# Patient Record
Sex: FEMALE | Race: AMERICAN INDIAN OR ALASKA NATIVE | HISPANIC OR LATINO | Employment: OTHER | URBAN - METROPOLITAN AREA
[De-identification: names, ages, dates, MRNs, and addresses within clinical notes are randomized per-mention and may not be internally consistent; named-entity substitution may affect disease eponyms.]

---

## 2024-04-07 ENCOUNTER — HOSPITAL ENCOUNTER (OUTPATIENT)
Facility: HOSPITAL | Age: 89
Setting detail: OBSERVATION
Discharge: HOME | End: 2024-04-08
Attending: GENERAL PRACTICE | Admitting: INTERNAL MEDICINE
Payer: MEDICARE

## 2024-04-07 ENCOUNTER — APPOINTMENT (OUTPATIENT)
Dept: RADIOLOGY | Facility: HOSPITAL | Age: 89
End: 2024-04-07
Payer: MEDICARE

## 2024-04-07 ENCOUNTER — APPOINTMENT (OUTPATIENT)
Dept: CARDIOLOGY | Facility: HOSPITAL | Age: 89
End: 2024-04-07
Payer: MEDICARE

## 2024-04-07 DIAGNOSIS — I27.20 PULMONARY HTN (MULTI): ICD-10-CM

## 2024-04-07 DIAGNOSIS — R59.0 MEDIASTINAL LYMPHADENOPATHY: ICD-10-CM

## 2024-04-07 DIAGNOSIS — G45.8 OTHER TRANSIENT CEREBRAL ISCHEMIC ATTACKS AND RELATED SYNDROMES: ICD-10-CM

## 2024-04-07 DIAGNOSIS — G45.9 TIA (TRANSIENT ISCHEMIC ATTACK): Primary | ICD-10-CM

## 2024-04-07 PROBLEM — E73.9 LACTOSE INTOLERANCE: Status: ACTIVE | Noted: 2021-11-01

## 2024-04-07 PROBLEM — M80.00XA AGE-RELATED OSTEOPOROSIS WITH CURRENT PATHOLOGICAL FRACTURE: Status: RESOLVED | Noted: 2018-03-29 | Resolved: 2024-04-07

## 2024-04-07 PROBLEM — I10 ESSENTIAL HYPERTENSION: Chronic | Status: ACTIVE | Noted: 2018-03-29

## 2024-04-07 PROBLEM — I15.0 RENOVASCULAR HYPERTENSION: Status: ACTIVE | Noted: 2024-03-25

## 2024-04-07 PROBLEM — I70.1 RENAL ARTERY STENOSIS (CMS-HCC): Status: ACTIVE | Noted: 2024-03-20

## 2024-04-07 PROBLEM — E78.5 DYSLIPIDEMIA: Status: ACTIVE | Noted: 2018-03-29

## 2024-04-07 PROBLEM — I10 ESSENTIAL HYPERTENSION: Status: ACTIVE | Noted: 2018-03-29

## 2024-04-07 PROBLEM — E55.9 VITAMIN D DEFICIENCY: Status: ACTIVE | Noted: 2018-09-13

## 2024-04-07 PROBLEM — M80.00XA AGE-RELATED OSTEOPOROSIS WITH CURRENT PATHOLOGICAL FRACTURE: Status: ACTIVE | Noted: 2018-03-29

## 2024-04-07 PROBLEM — E78.5 DYSLIPIDEMIA: Chronic | Status: ACTIVE | Noted: 2018-03-29

## 2024-04-07 PROBLEM — F51.01 PRIMARY INSOMNIA: Status: ACTIVE | Noted: 2019-05-30

## 2024-04-07 LAB
ALBUMIN SERPL BCP-MCNC: 3.4 G/DL (ref 3.4–5)
ALP SERPL-CCNC: 55 U/L (ref 33–136)
ALT SERPL W P-5'-P-CCNC: 15 U/L (ref 7–45)
ANION GAP SERPL CALC-SCNC: 11 MMOL/L (ref 10–20)
AST SERPL W P-5'-P-CCNC: 22 U/L (ref 9–39)
BASOPHILS # BLD AUTO: 0.01 X10*3/UL (ref 0–0.1)
BASOPHILS NFR BLD AUTO: 0.2 %
BILIRUB SERPL-MCNC: 0.4 MG/DL (ref 0–1.2)
BUN SERPL-MCNC: 22 MG/DL (ref 6–23)
CALCIUM SERPL-MCNC: 8.5 MG/DL (ref 8.6–10.3)
CARDIAC TROPONIN I PNL SERPL HS: 8 NG/L (ref 0–13)
CHLORIDE SERPL-SCNC: 92 MMOL/L (ref 98–107)
CHOLEST SERPL-MCNC: 116 MG/DL (ref 0–199)
CHOLESTEROL/HDL RATIO: 2.2
CO2 SERPL-SCNC: 25 MMOL/L (ref 21–32)
CREAT SERPL-MCNC: 0.76 MG/DL (ref 0.5–1.05)
EGFRCR SERPLBLD CKD-EPI 2021: 76 ML/MIN/1.73M*2
EOSINOPHIL # BLD AUTO: 0.13 X10*3/UL (ref 0–0.4)
EOSINOPHIL NFR BLD AUTO: 2.1 %
ERYTHROCYTE [DISTWIDTH] IN BLOOD BY AUTOMATED COUNT: 13.6 % (ref 11.5–14.5)
GLUCOSE BLD MANUAL STRIP-MCNC: 129 MG/DL (ref 74–99)
GLUCOSE BLD MANUAL STRIP-MCNC: 86 MG/DL (ref 74–99)
GLUCOSE SERPL-MCNC: 113 MG/DL (ref 74–99)
HCT VFR BLD AUTO: 28 % (ref 36–46)
HDLC SERPL-MCNC: 51.7 MG/DL
HGB BLD-MCNC: 9.1 G/DL (ref 12–16)
IMM GRANULOCYTES # BLD AUTO: 0.03 X10*3/UL (ref 0–0.5)
IMM GRANULOCYTES NFR BLD AUTO: 0.5 % (ref 0–0.9)
INR PPP: 1 (ref 0.9–1.1)
LDLC SERPL CALC-MCNC: 54 MG/DL
LYMPHOCYTES # BLD AUTO: 1.05 X10*3/UL (ref 0.8–3)
LYMPHOCYTES NFR BLD AUTO: 17.3 %
MAGNESIUM SERPL-MCNC: 1.7 MG/DL (ref 1.6–2.4)
MCH RBC QN AUTO: 28 PG (ref 26–34)
MCHC RBC AUTO-ENTMCNC: 32.5 G/DL (ref 32–36)
MCV RBC AUTO: 86 FL (ref 80–100)
MONOCYTES # BLD AUTO: 0.81 X10*3/UL (ref 0.05–0.8)
MONOCYTES NFR BLD AUTO: 13.3 %
NEUTROPHILS # BLD AUTO: 4.05 X10*3/UL (ref 1.6–5.5)
NEUTROPHILS NFR BLD AUTO: 66.6 %
NON HDL CHOLESTEROL: 64 MG/DL (ref 0–149)
NRBC BLD-RTO: 0 /100 WBCS (ref 0–0)
PLATELET # BLD AUTO: 235 X10*3/UL (ref 150–450)
POTASSIUM SERPL-SCNC: 4.1 MMOL/L (ref 3.5–5.3)
PROT SERPL-MCNC: 6.6 G/DL (ref 6.4–8.2)
PROTHROMBIN TIME: 11.8 SECONDS (ref 9.8–12.8)
RBC # BLD AUTO: 3.25 X10*6/UL (ref 4–5.2)
SODIUM SERPL-SCNC: 124 MMOL/L (ref 136–145)
TRIGL SERPL-MCNC: 54 MG/DL (ref 0–149)
VLDL: 11 MG/DL (ref 0–40)
WBC # BLD AUTO: 6.1 X10*3/UL (ref 4.4–11.3)

## 2024-04-07 PROCEDURE — 84484 ASSAY OF TROPONIN QUANT: CPT | Performed by: GENERAL PRACTICE

## 2024-04-07 PROCEDURE — 80061 LIPID PANEL: CPT | Performed by: PHYSICIAN ASSISTANT

## 2024-04-07 PROCEDURE — 99222 1ST HOSP IP/OBS MODERATE 55: CPT | Performed by: PHYSICIAN ASSISTANT

## 2024-04-07 PROCEDURE — 2500000004 HC RX 250 GENERAL PHARMACY W/ HCPCS (ALT 636 FOR OP/ED): Performed by: PHYSICIAN ASSISTANT

## 2024-04-07 PROCEDURE — G0378 HOSPITAL OBSERVATION PER HR: HCPCS

## 2024-04-07 PROCEDURE — 2500000002 HC RX 250 W HCPCS SELF ADMINISTERED DRUGS (ALT 637 FOR MEDICARE OP, ALT 636 FOR OP/ED): Mod: MUE | Performed by: PHYSICIAN ASSISTANT

## 2024-04-07 PROCEDURE — 94640 AIRWAY INHALATION TREATMENT: CPT

## 2024-04-07 PROCEDURE — 83735 ASSAY OF MAGNESIUM: CPT | Performed by: GENERAL PRACTICE

## 2024-04-07 PROCEDURE — 99291 CRITICAL CARE FIRST HOUR: CPT | Performed by: GENERAL PRACTICE

## 2024-04-07 PROCEDURE — 70450 CT HEAD/BRAIN W/O DYE: CPT | Performed by: RADIOLOGY

## 2024-04-07 PROCEDURE — 70496 CT ANGIOGRAPHY HEAD: CPT | Performed by: RADIOLOGY

## 2024-04-07 PROCEDURE — 2500000002 HC RX 250 W HCPCS SELF ADMINISTERED DRUGS (ALT 637 FOR MEDICARE OP, ALT 636 FOR OP/ED): Performed by: GENERAL PRACTICE

## 2024-04-07 PROCEDURE — 85610 PROTHROMBIN TIME: CPT | Performed by: GENERAL PRACTICE

## 2024-04-07 PROCEDURE — 96372 THER/PROPH/DIAG INJ SC/IM: CPT | Mod: 59 | Performed by: PHYSICIAN ASSISTANT

## 2024-04-07 PROCEDURE — 82947 ASSAY GLUCOSE BLOOD QUANT: CPT | Mod: 59

## 2024-04-07 PROCEDURE — 2550000001 HC RX 255 CONTRASTS: Performed by: GENERAL PRACTICE

## 2024-04-07 PROCEDURE — 2500000004 HC RX 250 GENERAL PHARMACY W/ HCPCS (ALT 636 FOR OP/ED): Performed by: GENERAL PRACTICE

## 2024-04-07 PROCEDURE — 99285 EMERGENCY DEPT VISIT HI MDM: CPT | Mod: 25

## 2024-04-07 PROCEDURE — 96374 THER/PROPH/DIAG INJ IV PUSH: CPT

## 2024-04-07 PROCEDURE — 93005 ELECTROCARDIOGRAM TRACING: CPT

## 2024-04-07 PROCEDURE — 36415 COLL VENOUS BLD VENIPUNCTURE: CPT | Performed by: GENERAL PRACTICE

## 2024-04-07 PROCEDURE — 85025 COMPLETE CBC W/AUTO DIFF WBC: CPT | Performed by: GENERAL PRACTICE

## 2024-04-07 PROCEDURE — 70498 CT ANGIOGRAPHY NECK: CPT | Performed by: RADIOLOGY

## 2024-04-07 PROCEDURE — 70450 CT HEAD/BRAIN W/O DYE: CPT | Mod: 59

## 2024-04-07 PROCEDURE — 2500000001 HC RX 250 WO HCPCS SELF ADMINISTERED DRUGS (ALT 637 FOR MEDICARE OP): Performed by: GENERAL PRACTICE

## 2024-04-07 PROCEDURE — 83036 HEMOGLOBIN GLYCOSYLATED A1C: CPT | Mod: AHULAB | Performed by: PHYSICIAN ASSISTANT

## 2024-04-07 PROCEDURE — 96376 TX/PRO/DX INJ SAME DRUG ADON: CPT

## 2024-04-07 PROCEDURE — 80053 COMPREHEN METABOLIC PANEL: CPT | Performed by: GENERAL PRACTICE

## 2024-04-07 PROCEDURE — 96361 HYDRATE IV INFUSION ADD-ON: CPT

## 2024-04-07 PROCEDURE — 2500000001 HC RX 250 WO HCPCS SELF ADMINISTERED DRUGS (ALT 637 FOR MEDICARE OP): Performed by: PHYSICIAN ASSISTANT

## 2024-04-07 PROCEDURE — 70498 CT ANGIOGRAPHY NECK: CPT

## 2024-04-07 PROCEDURE — 82947 ASSAY GLUCOSE BLOOD QUANT: CPT | Mod: 91

## 2024-04-07 PROCEDURE — 70496 CT ANGIOGRAPHY HEAD: CPT

## 2024-04-07 RX ORDER — DEXTROSE 50 % IN WATER (D50W) INTRAVENOUS SYRINGE
12.5
Status: DISCONTINUED | OUTPATIENT
Start: 2024-04-07 | End: 2024-04-08 | Stop reason: HOSPADM

## 2024-04-07 RX ORDER — HYDRALAZINE HYDROCHLORIDE 100 MG/1
100 TABLET, FILM COATED ORAL 3 TIMES DAILY
COMMUNITY

## 2024-04-07 RX ORDER — LOSARTAN POTASSIUM AND HYDROCHLOROTHIAZIDE 25; 100 MG/1; MG/1
1 TABLET ORAL DAILY
COMMUNITY
Start: 2023-11-13 | End: 2024-05-11

## 2024-04-07 RX ORDER — TRAZODONE HYDROCHLORIDE 50 MG/1
1 TABLET ORAL NIGHTLY
COMMUNITY
Start: 2023-06-02

## 2024-04-07 RX ORDER — BUDESONIDE 0.5 MG/2ML
0.5 INHALANT ORAL
Status: DISCONTINUED | OUTPATIENT
Start: 2024-04-07 | End: 2024-04-08 | Stop reason: HOSPADM

## 2024-04-07 RX ORDER — ASPIRIN 81 MG/1
81 TABLET ORAL DAILY
Status: DISCONTINUED | OUTPATIENT
Start: 2024-04-07 | End: 2024-04-08 | Stop reason: HOSPADM

## 2024-04-07 RX ORDER — HYDRALAZINE HYDROCHLORIDE 20 MG/ML
10 INJECTION INTRAMUSCULAR; INTRAVENOUS ONCE
Status: COMPLETED | OUTPATIENT
Start: 2024-04-07 | End: 2024-04-07

## 2024-04-07 RX ORDER — ENOXAPARIN SODIUM 100 MG/ML
40 INJECTION SUBCUTANEOUS EVERY 24 HOURS
Status: DISCONTINUED | OUTPATIENT
Start: 2024-04-07 | End: 2024-04-08 | Stop reason: HOSPADM

## 2024-04-07 RX ORDER — ATORVASTATIN CALCIUM 10 MG/1
10 TABLET, FILM COATED ORAL NIGHTLY
Status: ON HOLD | COMMUNITY
Start: 2023-12-20 | End: 2024-04-08 | Stop reason: SDUPTHER

## 2024-04-07 RX ORDER — BUDESONIDE AND FORMOTEROL FUMARATE DIHYDRATE 160; 4.5 UG/1; UG/1
2 AEROSOL RESPIRATORY (INHALATION) 2 TIMES DAILY
COMMUNITY
Start: 2024-03-20

## 2024-04-07 RX ORDER — TRAZODONE HYDROCHLORIDE 50 MG/1
100 TABLET ORAL NIGHTLY
Status: DISCONTINUED | OUTPATIENT
Start: 2024-04-07 | End: 2024-04-08 | Stop reason: HOSPADM

## 2024-04-07 RX ORDER — FORMOTEROL FUMARATE DIHYDRATE 20 UG/2ML
20 SOLUTION RESPIRATORY (INHALATION)
Status: DISCONTINUED | OUTPATIENT
Start: 2024-04-07 | End: 2024-04-08 | Stop reason: HOSPADM

## 2024-04-07 RX ORDER — ALENDRONATE SODIUM 70 MG/1
70 TABLET ORAL WEEKLY
COMMUNITY
Start: 2023-12-20 | End: 2024-12-19

## 2024-04-07 RX ORDER — CALCIUM CARBONATE/VITAMIN D3 600 MG-20
1 TABLET,CHEWABLE ORAL DAILY
COMMUNITY
Start: 2021-11-01

## 2024-04-07 RX ORDER — NAPROXEN SODIUM 220 MG/1
324 TABLET, FILM COATED ORAL ONCE
Status: COMPLETED | OUTPATIENT
Start: 2024-04-07 | End: 2024-04-07

## 2024-04-07 RX ORDER — FLUTICASONE FUROATE AND VILANTEROL 200; 25 UG/1; UG/1
1 POWDER RESPIRATORY (INHALATION)
Status: DISCONTINUED | OUTPATIENT
Start: 2024-04-08 | End: 2024-04-07

## 2024-04-07 RX ORDER — INSULIN LISPRO 100 [IU]/ML
0-5 INJECTION, SOLUTION INTRAVENOUS; SUBCUTANEOUS
Status: DISCONTINUED | OUTPATIENT
Start: 2024-04-07 | End: 2024-04-08 | Stop reason: HOSPADM

## 2024-04-07 RX ORDER — MELOXICAM 15 MG/1
15 TABLET ORAL DAILY
Status: ON HOLD | COMMUNITY
Start: 2023-08-07 | End: 2024-04-07 | Stop reason: ALTCHOICE

## 2024-04-07 RX ORDER — METOPROLOL SUCCINATE 25 MG/1
25 TABLET, EXTENDED RELEASE ORAL DAILY
COMMUNITY
Start: 2023-12-20

## 2024-04-07 RX ORDER — ALBUTEROL SULFATE 90 UG/1
1-2 AEROSOL, METERED RESPIRATORY (INHALATION) EVERY 6 HOURS PRN
COMMUNITY
Start: 2023-12-20

## 2024-04-07 RX ORDER — IPRATROPIUM BROMIDE AND ALBUTEROL SULFATE 2.5; .5 MG/3ML; MG/3ML
3 SOLUTION RESPIRATORY (INHALATION) ONCE
Status: COMPLETED | OUTPATIENT
Start: 2024-04-07 | End: 2024-04-07

## 2024-04-07 RX ORDER — ATORVASTATIN CALCIUM 40 MG/1
40 TABLET, FILM COATED ORAL NIGHTLY
Status: DISCONTINUED | OUTPATIENT
Start: 2024-04-07 | End: 2024-04-08 | Stop reason: HOSPADM

## 2024-04-07 RX ORDER — DEXTROSE 50 % IN WATER (D50W) INTRAVENOUS SYRINGE
25
Status: DISCONTINUED | OUTPATIENT
Start: 2024-04-07 | End: 2024-04-08 | Stop reason: HOSPADM

## 2024-04-07 RX ADMIN — HYDRALAZINE HYDROCHLORIDE 10 MG: 20 INJECTION INTRAMUSCULAR; INTRAVENOUS at 19:04

## 2024-04-07 RX ADMIN — SODIUM CHLORIDE 1000 ML: 9 INJECTION, SOLUTION INTRAVENOUS at 19:03

## 2024-04-07 RX ADMIN — IOHEXOL 75 ML: 350 INJECTION, SOLUTION INTRAVENOUS at 16:02

## 2024-04-07 RX ADMIN — HYDRALAZINE HYDROCHLORIDE 10 MG: 20 INJECTION INTRAMUSCULAR; INTRAVENOUS at 16:53

## 2024-04-07 RX ADMIN — TRAZODONE HYDROCHLORIDE 100 MG: 50 TABLET ORAL at 22:00

## 2024-04-07 RX ADMIN — ATORVASTATIN CALCIUM 40 MG: 40 TABLET, FILM COATED ORAL at 22:00

## 2024-04-07 RX ADMIN — ENOXAPARIN SODIUM 40 MG: 40 INJECTION SUBCUTANEOUS at 21:59

## 2024-04-07 RX ADMIN — FORMOTEROL FUMARATE DIHYDRATE 20 MCG: 20 SOLUTION RESPIRATORY (INHALATION) at 21:38

## 2024-04-07 RX ADMIN — IPRATROPIUM BROMIDE AND ALBUTEROL SULFATE 3 ML: 2.5; .5 SOLUTION RESPIRATORY (INHALATION) at 17:45

## 2024-04-07 RX ADMIN — BUDESONIDE INHALATION 0.5 MG: 0.5 SUSPENSION RESPIRATORY (INHALATION) at 21:38

## 2024-04-07 RX ADMIN — ASPIRIN 81 MG 324 MG: 81 TABLET ORAL at 16:25

## 2024-04-07 SDOH — SOCIAL STABILITY: SOCIAL INSECURITY: ABUSE: ADULT

## 2024-04-07 SDOH — SOCIAL STABILITY: SOCIAL INSECURITY: DO YOU FEEL UNSAFE GOING BACK TO THE PLACE WHERE YOU ARE LIVING?: NO

## 2024-04-07 SDOH — SOCIAL STABILITY: SOCIAL INSECURITY: DO YOU FEEL ANYONE HAS EXPLOITED OR TAKEN ADVANTAGE OF YOU FINANCIALLY OR OF YOUR PERSONAL PROPERTY?: NO

## 2024-04-07 SDOH — SOCIAL STABILITY: SOCIAL INSECURITY: DOES ANYONE TRY TO KEEP YOU FROM HAVING/CONTACTING OTHER FRIENDS OR DOING THINGS OUTSIDE YOUR HOME?: NO

## 2024-04-07 SDOH — SOCIAL STABILITY: SOCIAL INSECURITY: ARE THERE ANY APPARENT SIGNS OF INJURIES/BEHAVIORS THAT COULD BE RELATED TO ABUSE/NEGLECT?: NO

## 2024-04-07 SDOH — SOCIAL STABILITY: SOCIAL INSECURITY: HAVE YOU HAD THOUGHTS OF HARMING ANYONE ELSE?: NO

## 2024-04-07 SDOH — SOCIAL STABILITY: SOCIAL INSECURITY: ARE YOU OR HAVE YOU BEEN THREATENED OR ABUSED PHYSICALLY, EMOTIONALLY, OR SEXUALLY BY ANYONE?: NO

## 2024-04-07 SDOH — SOCIAL STABILITY: SOCIAL INSECURITY: WERE YOU ABLE TO COMPLETE ALL THE BEHAVIORAL HEALTH SCREENINGS?: YES

## 2024-04-07 SDOH — SOCIAL STABILITY: SOCIAL INSECURITY: HAS ANYONE EVER THREATENED TO HURT YOUR FAMILY OR YOUR PETS?: NO

## 2024-04-07 ASSESSMENT — LIFESTYLE VARIABLES
HOW MANY STANDARD DRINKS CONTAINING ALCOHOL DO YOU HAVE ON A TYPICAL DAY: PATIENT DOES NOT DRINK
AUDIT-C TOTAL SCORE: 0
SKIP TO QUESTIONS 9-10: 1
AUDIT-C TOTAL SCORE: 0
HOW OFTEN DO YOU HAVE 6 OR MORE DRINKS ON ONE OCCASION: NEVER
HOW OFTEN DO YOU HAVE A DRINK CONTAINING ALCOHOL: NEVER

## 2024-04-07 ASSESSMENT — ACTIVITIES OF DAILY LIVING (ADL)
FEEDING YOURSELF: INDEPENDENT
DRESSING YOURSELF: INDEPENDENT
BATHING: INDEPENDENT
GROOMING: INDEPENDENT
WALKS IN HOME: INDEPENDENT
TOILETING: INDEPENDENT
HEARING - LEFT EAR: FUNCTIONAL
ASSISTIVE_DEVICE: DENTURES UPPER;DENTURES LOWER
HEARING - RIGHT EAR: FUNCTIONAL
PATIENT'S MEMORY ADEQUATE TO SAFELY COMPLETE DAILY ACTIVITIES?: YES
LACK_OF_TRANSPORTATION: NO
ADEQUATE_TO_COMPLETE_ADL: YES
JUDGMENT_ADEQUATE_SAFELY_COMPLETE_DAILY_ACTIVITIES: YES

## 2024-04-07 ASSESSMENT — PAIN SCALES - GENERAL
PAINLEVEL_OUTOF10: 0 - NO PAIN
PAINLEVEL_OUTOF10: 0 - NO PAIN

## 2024-04-07 ASSESSMENT — COGNITIVE AND FUNCTIONAL STATUS - GENERAL
PATIENT BASELINE BEDBOUND: NO
STANDING UP FROM CHAIR USING ARMS: A LITTLE
CLIMB 3 TO 5 STEPS WITH RAILING: A LITTLE
WALKING IN HOSPITAL ROOM: A LITTLE
DAILY ACTIVITIY SCORE: 23
DRESSING REGULAR LOWER BODY CLOTHING: A LITTLE
MOBILITY SCORE: 21

## 2024-04-07 ASSESSMENT — PAIN - FUNCTIONAL ASSESSMENT
PAIN_FUNCTIONAL_ASSESSMENT: 0-10
PAIN_FUNCTIONAL_ASSESSMENT: 0-10

## 2024-04-07 ASSESSMENT — PATIENT HEALTH QUESTIONNAIRE - PHQ9
1. LITTLE INTEREST OR PLEASURE IN DOING THINGS: NOT AT ALL
2. FEELING DOWN, DEPRESSED OR HOPELESS: NOT AT ALL
SUM OF ALL RESPONSES TO PHQ9 QUESTIONS 1 & 2: 0

## 2024-04-07 ASSESSMENT — COLUMBIA-SUICIDE SEVERITY RATING SCALE - C-SSRS
6. HAVE YOU EVER DONE ANYTHING, STARTED TO DO ANYTHING, OR PREPARED TO DO ANYTHING TO END YOUR LIFE?: NO
2. HAVE YOU ACTUALLY HAD ANY THOUGHTS OF KILLING YOURSELF?: NO
1. IN THE PAST MONTH, HAVE YOU WISHED YOU WERE DEAD OR WISHED YOU COULD GO TO SLEEP AND NOT WAKE UP?: NO

## 2024-04-07 NOTE — ED PROVIDER NOTES
HPI   No chief complaint on file.      HPI: 88-year-old female with a history of hypertension presents for dysarthria and left-sided weakness.  At approximately noon the patient told her family that she felt tired and was going to lie down.  At approximately 12:50 PM family checked on her and noted that she had a facial droop and left-sided weakness and dysarthria.  They did not call EMS until 3 PM.  On presentation to the ED her left-sided weakness has resolved but she is left with dysarthria/aphasia.  She is denying chest pain, headache, change in vision.      Limitations to history: None  Independent Historians: Patient's family, EMS  External Records Reviewed: HIE, outpatient notes, inpatient notes  ------------------------------------------------------------------------------------------------------------------------------------------  ROS: a ten point review of systems was performed and was negative except as per HPI.  ------------------------------------------------------------------------------------------------------------------------------------------  PMH / PSH: as per HPI, otherwise reviewed in EMR  MEDS: as per HPI, otherwise reviewed in EMR  ALLERGIES: as per HPI, otherwise reviewed in EMR  SocH:  as per HPI, otherwise reviewed in EMR  FH:  as per HPI, otherwise reviewed in EMR  ------------------------------------------------------------------------------------------------------------------------------------------  Physical Exam:  VS: As documented in the triage note and EMR flowsheet from this visit was reviewed  General: Well appearing. No acute distress.   Eyes:  Extraocular movements grossly intact. No scleral icterus. No discharge  HEENT:  Normocephalic.  Atraumatic  Neck: Moves neck freely. No gross masses  CV: Regular rhythm. No murmurs, rubs or gallops   Resp: Clear to auscultation bilaterally. No respiratory distress.    GI: Soft, no masses, nontender. No rebound tenderness or guarding  MSK:  Symmetric muscle bulk. No deformities. No lower extremity edema.    Skin: Warm, dry, intact.   Neuro: Severe dysarthria.  No drift in the upper or lower extremities.  No ataxia.  NIH stroke scale of 5  Psych: Appropriate for situation  ------------------------------------------------------------------------------------------------------------------------------------------  Hospital Course / Medical Decision Making:  Independent Interpretations: CT head, CTA head and neck  EKG as interpreted by me: Normal sinus rhythm at 62 bpm with a first-degree AV block with a SD interval of 226 ms with a normal axis, no bundle branch block no signs of acute ischemia    MDM: This is an 88-year-old female with a history of hypertension presenting for dysarthria and left-sided weakness.  On presentation the patient's left-sided weakness has resolved but she is left with dysarthria/aphasia.  She is van positive.  A brain attack was called and the patient was brought to CT.  No major vessel cutoff for acute intracranial process noted.  Initially the patient was thought to be a TNK candidate but when the patient returned from CT her dysarthria/aphasia seems to have resolved.  For this reason she was not given TNK.  Her son is present at bedside and states that she is speaking normally for her.  She was given aspirin and was admitted to the medicine service for further evaluation.    Discussion of Management with Other Providers:   I discussed the patient/results with: Emergency medicine team    Final diagnosis and disposition as below.    Results for orders placed or performed during the hospital encounter of 04/07/24  -CBC and Auto Differential:        Result                      Value             Ref Range           WBC                         6.1               4.4 - 11.3 x*       nRBC                        0.0               0.0 - 0.0 /1*       RBC                         3.25 (L)          4.00 - 5.20 *       Hemoglobin                   9.1 (L)           12.0 - 16.0 *       Hematocrit                  28.0 (L)          36.0 - 46.0 %       MCV                         86                80 - 100 fL         MCH                         28.0              26.0 - 34.0 *       MCHC                        32.5              32.0 - 36.0 *       RDW                         13.6              11.5 - 14.5 %       Platelets                   235               150 - 450 x1*       Neutrophils %               66.6              40.0 - 80.0 %       Immature Granulocytes *     0.5               0.0 - 0.9 %         Lymphocytes %               17.3              13.0 - 44.0 %       Monocytes %                 13.3              2.0 - 10.0 %        Eosinophils %               2.1               0.0 - 6.0 %         Basophils %                 0.2               0.0 - 2.0 %         Neutrophils Absolute        4.05              1.60 - 5.50 *       Immature Granulocytes *     0.03              0.00 - 0.50 *       Lymphocytes Absolute        1.05              0.80 - 3.00 *       Monocytes Absolute          0.81 (H)          0.05 - 0.80 *       Eosinophils Absolute        0.13              0.00 - 0.40 *       Basophils Absolute          0.01              0.00 - 0.10 *  -Magnesium:        Result                      Value             Ref Range           Magnesium                   1.70              1.60 - 2.40 *  -Comprehensive metabolic panel:        Result                      Value             Ref Range           Glucose                     113 (H)           74 - 99 mg/dL       Sodium                      124 (L)           136 - 145 mm*       Potassium                   4.1               3.5 - 5.3 mm*       Chloride                    92 (L)            98 - 107 mmo*       Bicarbonate                 25                21 - 32 mmol*       Anion Gap                   11                10 - 20 mmol*       Urea Nitrogen               22                6 - 23 mg/dL        Creatinine                   0.76              0.50 - 1.05 *       eGFR                        76                >60 mL/min/1*       Calcium                     8.5 (L)           8.6 - 10.3 m*       Albumin                     3.4               3.4 - 5.0 g/*       Alkaline Phosphatase        55                33 - 136 U/L        Total Protein               6.6               6.4 - 8.2 g/*       AST                         22                9 - 39 U/L          Bilirubin, Total            0.4               0.0 - 1.2 mg*       ALT                         15                7 - 45 U/L     -Troponin I, High Sensitivity:        Result                      Value             Ref Range           Troponin I, High Sensi*     8                 0 - 13 ng/L    -Protime-INR:        Result                      Value             Ref Range           Protime                     11.8              9.8 - 12.8 s*       INR                         1.0               0.9 - 1.1      -POCT GLUCOSE:        Result                      Value             Ref Range           POCT Glucose                129 (H)           74 - 99 mg/dL  CT angio brain attack neck w IV contrast and post procedure   Final Result    * A proximally 50% luminal narrowing in the 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          MACRO:    none          Signed by: Hector Sweet 4/7/2024 4:18 PM    Dictation workstation:   [id number]     CT brain attack head wo IV contrast   Final Result    No evidence of acute cortical infarct or intracranial hemorrhage. If    there is persistent clinical concern for acute cortical infarction,    MRI with diffusion-weighted images is a better means for further    evaluation as clinically warranted.          SUPPLEMENTAL INFORMATION:    Notifi message was left for BRITTNEE AMAYA regarding this exam by Dr. Steel on 4/7/2024 at approximately 15:44 hours.                Signed by: Nola Steel 4/7/2024 3:45 PM    Dictation workstation:   [id number]                               [place] Coma Scale Score: 15         NIH Stroke Scale: 1             Patient History   No past medical history on file.  No past surgical history on file.  No family history on file.  Social History     Tobacco Use    Smoking status: Not on file    Smokeless tobacco: Not on file   Substance Use Topics    Alcohol use: Not on file    Drug use: Not on file       Physical Exam   ED Triage Vitals [04/07/24 1625]   Temperature Heart Rate Respirations BP   36.9 °C (98.4 °F) 64 (!) 25 (!) 208/53      Pulse Ox Temp src Heart Rate Source Patient Position   (!) 93 % -- -- --      BP Location FiO2 (%)     -- --       Physical Exam    ED Course & MDM   Diagnoses as of 04/15/24 1928   TIA (transient ischemic attack)       Medical Decision Making      Procedure  Critical Care    Performed by: Brittnee Amaya DO  Authorized by: Brittnee Amaya DO    Critical care provider statement:     Critical care time (minutes):  30    Critical care time was exclusive of:  Separately billable procedures and treating other patients    Critical care was necessary to treat or prevent imminent or life-threatening deterioration of the  following conditions:  CNS failure or compromise    Critical care was time spent personally by me on the following activities:  Development of treatment plan with patient or surrogate, examination of patient, ordering and review of radiographic studies, ordering and review of laboratory studies and re-evaluation of patient's condition    Care discussed with: admitting provider         Subhash Amaya DO  04/15/24 8289

## 2024-04-07 NOTE — H&P
History Of Present Illness  Rosalio Brewster is a 88 y.o. female with PMH significant for renovascular hypertension, R renal artery stenosis, HTN, and who presented to the ER with facial droop, left sided weakness, and dysarthria.   LKW was around 12pm.  Patient was feeling tired so she went to lie down.  Family checked on her around 1250pm and noted patient had facial droop, left sided weakness and dysarthria.  EMS was called at 3pm, and patient arrived in the ER around 330pm and brain attack was called.  Per report on arrival to the ER left sided weakness had resolved, and only had dysarthria.  CTH negative.  CTA without acute cutoffs.  By the time she returned from CT, she was back to baseline neuro status.  She was given full dose aspirin and admitted to the observation unit for TIA/CVA work-up. Her BP was elevated in the ER, and she received IV hydralazine.  Patient has known renal artery stenosis, with 90% stenosis of R renal artery, and is scheduled to have stent placed on 4/9/24 at [facility].    Per son patient also had negative stress test recently at [facility].      She can read/speak/understand English, but English is 2nd language.  Son does act as  for some questions and to assist in her understanding the commands asked etc.  She lives at home with family.  She is ambulatory baseline, eats healthy, and stays active.    Past Medical History  Past Medical History:   Diagnosis Date    Age-related osteoporosis with current pathological fracture 03/29/2018    Dyslipidemia 03/29/2018    Essential hypertension 03/29/2018    Mild persistent asthma without complication 09/20/2016    Primary insomnia 05/30/2019    Renal artery stenosis (CMS/HCC) 03/20/2024    Renovascular hypertension 03/25/2024    Vitamin D deficiency 09/13/2018       Surgical History  No past surgical history on file.    Social History  Nicotine: Never  ETOH: Denies  Illicit substances: Denies     Family History  Reviewed.   Negative/non-contributory      Allergies  Allergies as of 04/07/2024 - Reviewed 04/07/2024   Allergen Reaction Noted    Latex Unknown 11/13/2023    Penicillins Rash 07/29/2002        Review of Systems  Review of Systems   Constitutional:  Negative for chills and fever.   HENT: Negative.     Eyes: Negative.    Respiratory: Negative.  Negative for shortness of breath.    Cardiovascular: Negative.  Negative for chest pain.   Gastrointestinal: Negative.  Negative for abdominal pain, nausea and vomiting.   Endocrine: Negative.    Genitourinary: Negative.    Musculoskeletal: Negative.    Skin: Negative.    Allergic/Immunologic: Negative.    Neurological:  Positive for facial asymmetry and speech difficulty.        SEE HPI     Hematological: Negative.    Psychiatric/Behavioral: Negative.     All other systems reviewed and are negative.      Relevant results reviewed   PROVIDER notes  Nursing notes  MEDS:  Current Facility-Administered Medications   Medication Dose Route Frequency Provider Last Rate Last Admin    aspirin EC tablet 81 mg  81 mg oral Daily Belinda Vásquez PA-C        atorvastatin (Lipitor) tablet 40 mg  40 mg oral Nightly Belinda Vásquez PA-C   40 mg at 04/07/24 2200    budesonide (Pulmicort) 0.5 mg/2 mL nebulizer solution 0.5 mg  0.5 mg nebulization BID GARETH Mata-C   0.5 mg at 04/07/24 2138    dextrose 50 % injection 12.5 g  12.5 g intravenous q15 min PRN Belinda Vásquez PA-C        dextrose 50 % injection 25 g  25 g intravenous q15 min PRN Belinda Vásquez PA-C        enoxaparin (Lovenox) syringe 40 mg  40 mg subcutaneous q24h Belinda Vásquez PA-C   40 mg at 04/07/24 2159    formoterol (Perforomist) 20 mcg/2 mL nebulizer solution 20 mcg  20 mcg nebulization BID ELIAS MataC   20 mcg at 04/07/24 2138    glucagon (Glucagen) injection 1 mg  1 mg intramuscular q15 min PRN Belinda Vásquez PA-C        glucagon (Glucagen) injection 1 mg  1 mg intramuscular q15 min PRN Belinda Vásquez PA-C         insulin lispro (HumaLOG) injection 0-5 Units  0-5 Units subcutaneous Before meals & nightly Belinda Vásquez PA-C        traZODone (Desyrel) tablet 100 mg  100 mg oral Nightly Belinda Vásquez PA-C   100 mg at 04/07/24 2200      LABS:  Results for orders placed or performed during the hospital encounter of 04/07/24 (from the past 24 hour(s))   POCT GLUCOSE   Result Value Ref Range    POCT Glucose 129 (H) 74 - 99 mg/dL   CBC and Auto Differential   Result Value Ref Range    WBC 6.1 4.4 - 11.3 x10*3/uL    nRBC 0.0 0.0 - 0.0 /100 WBCs    RBC 3.25 (L) 4.00 - 5.20 x10*6/uL    Hemoglobin 9.1 (L) 12.0 - 16.0 g/dL    Hematocrit 28.0 (L) 36.0 - 46.0 %    MCV 86 80 - 100 fL    MCH 28.0 26.0 - 34.0 pg    MCHC 32.5 32.0 - 36.0 g/dL    RDW 13.6 11.5 - 14.5 %    Platelets 235 150 - 450 x10*3/uL    Neutrophils % 66.6 40.0 - 80.0 %    Immature Granulocytes %, Automated 0.5 0.0 - 0.9 %    Lymphocytes % 17.3 13.0 - 44.0 %    Monocytes % 13.3 2.0 - 10.0 %    Eosinophils % 2.1 0.0 - 6.0 %    Basophils % 0.2 0.0 - 2.0 %    Neutrophils Absolute 4.05 1.60 - 5.50 x10*3/uL    Immature Granulocytes Absolute, Automated 0.03 0.00 - 0.50 x10*3/uL    Lymphocytes Absolute 1.05 0.80 - 3.00 x10*3/uL    Monocytes Absolute 0.81 (H) 0.05 - 0.80 x10*3/uL    Eosinophils Absolute 0.13 0.00 - 0.40 x10*3/uL    Basophils Absolute 0.01 0.00 - 0.10 x10*3/uL   Magnesium   Result Value Ref Range    Magnesium 1.70 1.60 - 2.40 mg/dL   Comprehensive metabolic panel   Result Value Ref Range    Glucose 113 (H) 74 - 99 mg/dL    Sodium 124 (L) 136 - 145 mmol/L    Potassium 4.1 3.5 - 5.3 mmol/L    Chloride 92 (L) 98 - 107 mmol/L    Bicarbonate 25 21 - 32 mmol/L    Anion Gap 11 10 - 20 mmol/L    Urea Nitrogen 22 6 - 23 mg/dL    Creatinine 0.76 0.50 - 1.05 mg/dL    eGFR 76 >60 mL/min/1.73m*2    Calcium 8.5 (L) 8.6 - 10.3 mg/dL    Albumin 3.4 3.4 - 5.0 g/dL    Alkaline Phosphatase 55 33 - 136 U/L    Total Protein 6.6 6.4 - 8.2 g/dL    AST 22 9 - 39 U/L    Bilirubin,  Total 0.4 0.0 - 1.2 mg/dL    ALT 15 7 - 45 U/L   Troponin I, High Sensitivity   Result Value Ref Range    Troponin I, High Sensitivity 8 0 - 13 ng/L   Protime-INR   Result Value Ref Range    Protime 11.8 9.8 - 12.8 seconds    INR 1.0 0.9 - 1.1   Lipid Panel   Result Value Ref Range    Cholesterol 116 0 - 199 mg/dL    HDL-Cholesterol 51.7 mg/dL    Cholesterol/HDL Ratio 2.2     LDL Calculated 54 <=99 mg/dL    VLDL 11 0 - 40 mg/dL    Triglycerides 54 0 - 149 mg/dL    Non HDL Cholesterol 64 0 - 149 mg/dL   POCT GLUCOSE   Result Value Ref Range    POCT Glucose 86 74 - 99 mg/dL      IMAGING:  CT angio brain attack neck w IV contrast and post procedure   Final Result   * A proximally 50% luminal narrowing in the proximal right internal   carotid 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        MACRO:   none        Signed by: Hector Sweet 4/7/2024 4:18 PM   Dictation workstation:   [id number]      CT angio brain attack head w IV contrast and post procedure   Final Result   * A proximally 50% luminal narrowing in the proximal right internal   carotid 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        MACRO:   none        Signed by: Hector Sweet 4/7/2024 4:18 PM   Dictation workstation:   [id number]      CT brain attack head wo IV contrast   Final Result   No evidence of acute cortical infarct or intracranial hemorrhage. If   there is persistent clinical concern for acute cortical infarction,   MRI with diffusion-weighted images is a better means for further   evaluation as clinically  "warranted.        SUPPLEMENTAL INFORMATION:   Notifi message was left for BRITTNEE AYALA regarding this exam by Dr. Steel on 4/7/2024 at approximately 15:44 hours.             Signed by: Nola Steel 4/7/2024 3:45 PM   Dictation workstation:   [id number]             PHYSICAL EXAM  /50 (BP Location: Right arm, Patient Position: Lying)   Pulse 56   Temp 36.8 °C (98.2 °F) (Temporal)   Resp 18   Ht 1.499 m (4' 11\")   Wt (!) 44 kg (97 lb)   SpO2 94%   BMI 19.59 kg/m²   GENERAL: Alert, NAD, cooperative  SKIN: Warm and dry.  No suspicious lesions or rashes.  HEENT:  NCAT, PERRLA, EOMI, nonicteric sclera, MMM, neck supple, trachea midline  LUNGS: Unlabored, CTAB, no significant wheezing, rhonchi, or rales appreciated  CARDS: RRR, no m/g/r appreciated  GI: Soft, NTND, BS+, no rebound, no guarding   : no miller, voids independently   MS/Extremities: WWP, no edema, distal pulses intact, moves all extremities  NEURO: A&Ox4, CN2-12 intact, smile symmetric, strength sensation equal/intact b/l UE and LE, finger-to-nose intact, no dysarthria appreciated (mama, tip-top, fifty-fifty, thanks, & ), per family dysarthria resolved and now at baseline, patient appears at her baseline mental status currently  PSYCH: mood and behavior appropriate    Assessment/Plan:   Rosalio Brewster is a 88 y.o. female with PMH significant for renovascular hypertension, R renal artery stenosis, HTN, and who presented to the ER with facial droop, left sided weakness, and dysarthria.   LKW was around 12pm.  Patient was feeling tired so she went to lie down.  Family checked on her around 1250pm and noted patient had facial droop, left sided weakness and dysarthria.  EMS was called at 3pm, and patient arrived in the ER around 330pm and brain attack was called.  Per report on arrival to the ER left sided weakness had resolved, and only had dysarthria.  CTH negative.  CTA without acute cutoffs.  By the time she returned from CT, she was " back to baseline neuro status.  She was given full dose aspirin and admitted to the observation unit for TIA/CVA work-up. Her BP was elevated in the ER, and she received IV hydralazine.  Patient has known renal artery stenosis, with 90% stenosis of R renal artery, and is scheduled to have stent placed on 4/9/24 at [facility].   Per son patient also had negative stress test recently at [facility].     She can read/speak/understand English, but English is 2nd language.  Son does act as  for some questions and to assist in her understanding the commands asked etc.  She lives at home with family.  She is ambulatory baseline, eats healthy, and stays active.     Principal Problem:    TIA (transient ischemic attack)  Active Problems:    Dyslipidemia    Essential hypertension    Renal artery stenosis (CMS/HCC)    Renovascular hypertension    Dysarthria   -CBC: no leukocytosis, no acute anemia, no thrombocytopenia  -CMP: no significant electrolyte abnormalities, no acute renal/liver dysfunction appreciated   -HS Trop 8  -CTH: no acute processes  -CTA head/neck: No evidence of LVO or clinically significant stenosis.   -placed tia/stroke care path --> neuro checks as appropriate   -monitor on tele   -MRI brain pending   -ASA/statin  -Hba1c/lipid panel pending   -Permissive HTN for 24h (SBP < 220 or DBP < 110)   -consider neuro consult pending MRI results   -dysarthria resolved --> hold off on speech therapy consult for now  -PT/OT: no indication at this time, patient at baseline   -had recent stress test at [facility], and likely ECHO as well, no indication for in-house ECHO at this time, if no recent ECHO can get ECHO with bubble study as outpatient   -she is scheduled for R renal artery stent placement on 4/9/24   -GI ppx: Protonix. Bowel regimen.   -VTE ppx: Lovenox    Total time spent [including but not limited to]: Obtaining and reviewing patient medical records/history, obtaining a separate history,   examining and assessing the patient, providing  and education, placing pertinent orders for labs/tests/medications,  communicating with the patient/family/health team, documenting in the patient's EMR/formulation of this note, independently interpreting results and data, coordinating care:   60 minutes, with greater than 50% spent in personal discussion with patient and/or family    Belinda Vásquez PA-C

## 2024-04-08 ENCOUNTER — APPOINTMENT (OUTPATIENT)
Dept: RADIOLOGY | Facility: HOSPITAL | Age: 89
End: 2024-04-08
Payer: MEDICARE

## 2024-04-08 ENCOUNTER — APPOINTMENT (OUTPATIENT)
Dept: CARDIOLOGY | Facility: HOSPITAL | Age: 89
End: 2024-04-08
Payer: MEDICARE

## 2024-04-08 VITALS
WEIGHT: 97 LBS | DIASTOLIC BLOOD PRESSURE: 57 MMHG | HEIGHT: 59 IN | RESPIRATION RATE: 22 BRPM | HEART RATE: 62 BPM | BODY MASS INDEX: 19.56 KG/M2 | OXYGEN SATURATION: 98 % | TEMPERATURE: 98 F | SYSTOLIC BLOOD PRESSURE: 161 MMHG

## 2024-04-08 PROBLEM — R47.1 DYSARTHRIA: Status: RESOLVED | Noted: 2024-04-08 | Resolved: 2024-04-08

## 2024-04-08 PROBLEM — R47.1 DYSARTHRIA: Status: ACTIVE | Noted: 2024-04-08

## 2024-04-08 LAB
ALBUMIN SERPL BCP-MCNC: 3.1 G/DL (ref 3.4–5)
ALP SERPL-CCNC: 51 U/L (ref 33–136)
ALT SERPL W P-5'-P-CCNC: 13 U/L (ref 7–45)
ANION GAP SERPL CALC-SCNC: 9 MMOL/L (ref 10–20)
AST SERPL W P-5'-P-CCNC: 19 U/L (ref 9–39)
ATRIAL RATE: 62 BPM
BASOPHILS # BLD AUTO: 0 X10*3/UL (ref 0–0.1)
BASOPHILS NFR BLD AUTO: 0 %
BILIRUB SERPL-MCNC: 0.5 MG/DL (ref 0–1.2)
BNP SERPL-MCNC: 771 PG/ML (ref 0–99)
BUN SERPL-MCNC: 16 MG/DL (ref 6–23)
CALCIUM SERPL-MCNC: 8.4 MG/DL (ref 8.6–10.3)
CHLORIDE SERPL-SCNC: 100 MMOL/L (ref 98–107)
CO2 SERPL-SCNC: 26 MMOL/L (ref 21–32)
CREAT SERPL-MCNC: 0.79 MG/DL (ref 0.5–1.05)
EGFRCR SERPLBLD CKD-EPI 2021: 73 ML/MIN/1.73M*2
EJECTION FRACTION APICAL 4 CHAMBER: 72.8
EOSINOPHIL # BLD AUTO: 0.13 X10*3/UL (ref 0–0.4)
EOSINOPHIL NFR BLD AUTO: 2.2 %
ERYTHROCYTE [DISTWIDTH] IN BLOOD BY AUTOMATED COUNT: 13.7 % (ref 11.5–14.5)
EST. AVERAGE GLUCOSE BLD GHB EST-MCNC: 120 MG/DL
GLUCOSE BLD MANUAL STRIP-MCNC: 117 MG/DL (ref 74–99)
GLUCOSE BLD MANUAL STRIP-MCNC: 82 MG/DL (ref 74–99)
GLUCOSE BLD MANUAL STRIP-MCNC: 86 MG/DL (ref 74–99)
GLUCOSE SERPL-MCNC: 80 MG/DL (ref 74–99)
HBA1C MFR BLD: 5.8 %
HCT VFR BLD AUTO: 27.3 % (ref 36–46)
HGB BLD-MCNC: 8.6 G/DL (ref 12–16)
IMM GRANULOCYTES # BLD AUTO: 0.03 X10*3/UL (ref 0–0.5)
IMM GRANULOCYTES NFR BLD AUTO: 0.5 % (ref 0–0.9)
LEFT ATRIUM VOLUME AREA LENGTH INDEX BSA: 35.7 ML/M2
LEFT VENTRICLE INTERNAL DIMENSION DIASTOLE: 3.66 CM (ref 3.5–6)
LEFT VENTRICULAR OUTFLOW TRACT DIAMETER: 1.99 CM
LV EJECTION FRACTION BIPLANE: 72 %
LYMPHOCYTES # BLD AUTO: 0.96 X10*3/UL (ref 0.8–3)
LYMPHOCYTES NFR BLD AUTO: 16.2 %
MCH RBC QN AUTO: 27.3 PG (ref 26–34)
MCHC RBC AUTO-ENTMCNC: 31.5 G/DL (ref 32–36)
MCV RBC AUTO: 87 FL (ref 80–100)
MITRAL VALVE E/A RATIO: 1.6
MONOCYTES # BLD AUTO: 0.85 X10*3/UL (ref 0.05–0.8)
MONOCYTES NFR BLD AUTO: 14.3 %
NEUTROPHILS # BLD AUTO: 3.97 X10*3/UL (ref 1.6–5.5)
NEUTROPHILS NFR BLD AUTO: 66.8 %
NRBC BLD-RTO: 0 /100 WBCS (ref 0–0)
P AXIS: 75 DEGREES
P OFFSET: 146 MS
P ONSET: 108 MS
PLATELET # BLD AUTO: 216 X10*3/UL (ref 150–450)
POTASSIUM SERPL-SCNC: 3.8 MMOL/L (ref 3.5–5.3)
PR INTERVAL: 226 MS
PROT SERPL-MCNC: 6 G/DL (ref 6.4–8.2)
Q ONSET: 221 MS
QRS COUNT: 11 BEATS
QRS DURATION: 80 MS
QT INTERVAL: 394 MS
QTC CALCULATION(BAZETT): 399 MS
QTC FREDERICIA: 398 MS
R AXIS: 33 DEGREES
RBC # BLD AUTO: 3.15 X10*6/UL (ref 4–5.2)
RIGHT VENTRICLE FREE WALL PEAK S': 16 CM/S
RIGHT VENTRICLE PEAK SYSTOLIC PRESSURE: 64.4 MMHG
SODIUM SERPL-SCNC: 131 MMOL/L (ref 136–145)
T AXIS: 41 DEGREES
T OFFSET: 418 MS
TRICUSPID ANNULAR PLANE SYSTOLIC EXCURSION: 2.5 CM
VENTRICULAR RATE: 62 BPM
WBC # BLD AUTO: 5.9 X10*3/UL (ref 4.4–11.3)

## 2024-04-08 PROCEDURE — 36415 COLL VENOUS BLD VENIPUNCTURE: CPT | Performed by: PHYSICIAN ASSISTANT

## 2024-04-08 PROCEDURE — 85025 COMPLETE CBC W/AUTO DIFF WBC: CPT | Performed by: PHYSICIAN ASSISTANT

## 2024-04-08 PROCEDURE — 80053 COMPREHEN METABOLIC PANEL: CPT | Performed by: PHYSICIAN ASSISTANT

## 2024-04-08 PROCEDURE — 70551 MRI BRAIN STEM W/O DYE: CPT | Performed by: RADIOLOGY

## 2024-04-08 PROCEDURE — 94640 AIRWAY INHALATION TREATMENT: CPT

## 2024-04-08 PROCEDURE — 70551 MRI BRAIN STEM W/O DYE: CPT

## 2024-04-08 PROCEDURE — 99231 SBSQ HOSP IP/OBS SF/LOW 25: CPT | Performed by: NURSE PRACTITIONER

## 2024-04-08 PROCEDURE — 93306 TTE W/DOPPLER COMPLETE: CPT

## 2024-04-08 PROCEDURE — 71046 X-RAY EXAM CHEST 2 VIEWS: CPT | Performed by: RADIOLOGY

## 2024-04-08 PROCEDURE — 2500000002 HC RX 250 W HCPCS SELF ADMINISTERED DRUGS (ALT 637 FOR MEDICARE OP, ALT 636 FOR OP/ED): Mod: MUE | Performed by: PHYSICIAN ASSISTANT

## 2024-04-08 PROCEDURE — 71046 X-RAY EXAM CHEST 2 VIEWS: CPT

## 2024-04-08 PROCEDURE — 82947 ASSAY GLUCOSE BLOOD QUANT: CPT | Mod: 59

## 2024-04-08 PROCEDURE — 2500000001 HC RX 250 WO HCPCS SELF ADMINISTERED DRUGS (ALT 637 FOR MEDICARE OP): Performed by: INTERNAL MEDICINE

## 2024-04-08 PROCEDURE — 83880 ASSAY OF NATRIURETIC PEPTIDE: CPT | Performed by: PHYSICIAN ASSISTANT

## 2024-04-08 PROCEDURE — G0378 HOSPITAL OBSERVATION PER HR: HCPCS

## 2024-04-08 PROCEDURE — 82947 ASSAY GLUCOSE BLOOD QUANT: CPT | Mod: 91

## 2024-04-08 PROCEDURE — 2500000001 HC RX 250 WO HCPCS SELF ADMINISTERED DRUGS (ALT 637 FOR MEDICARE OP): Performed by: PHYSICIAN ASSISTANT

## 2024-04-08 RX ORDER — ATORVASTATIN CALCIUM 10 MG/1
40 TABLET, FILM COATED ORAL NIGHTLY
Qty: 120 TABLET | Refills: 0 | Status: SHIPPED | OUTPATIENT
Start: 2024-04-08 | End: 2024-05-08

## 2024-04-08 RX ORDER — ACETAMINOPHEN 325 MG/1
650 TABLET ORAL EVERY 6 HOURS PRN
Status: DISCONTINUED | OUTPATIENT
Start: 2024-04-08 | End: 2024-04-08 | Stop reason: HOSPADM

## 2024-04-08 RX ORDER — TALC
3 POWDER (GRAM) TOPICAL NIGHTLY PRN
Status: DISCONTINUED | OUTPATIENT
Start: 2024-04-08 | End: 2024-04-08 | Stop reason: HOSPADM

## 2024-04-08 RX ORDER — HYDRALAZINE HYDROCHLORIDE 25 MG/1
25 TABLET, FILM COATED ORAL 3 TIMES DAILY
Status: DISCONTINUED | OUTPATIENT
Start: 2024-04-08 | End: 2024-04-08 | Stop reason: HOSPADM

## 2024-04-08 RX ORDER — ASPIRIN 81 MG/1
81 TABLET ORAL DAILY
Qty: 90 TABLET | Refills: 0 | Status: SHIPPED | OUTPATIENT
Start: 2024-04-08

## 2024-04-08 RX ORDER — HYDRALAZINE HYDROCHLORIDE 50 MG/1
100 TABLET, FILM COATED ORAL 3 TIMES DAILY
Status: DISCONTINUED | OUTPATIENT
Start: 2024-04-08 | End: 2024-04-08

## 2024-04-08 RX ORDER — METOPROLOL SUCCINATE 25 MG/1
25 TABLET, EXTENDED RELEASE ORAL DAILY
Status: DISCONTINUED | OUTPATIENT
Start: 2024-04-08 | End: 2024-04-08 | Stop reason: HOSPADM

## 2024-04-08 RX ORDER — ALBUTEROL SULFATE 0.83 MG/ML
2.5 SOLUTION RESPIRATORY (INHALATION) EVERY 2 HOUR PRN
Status: DISCONTINUED | OUTPATIENT
Start: 2024-04-08 | End: 2024-04-08 | Stop reason: HOSPADM

## 2024-04-08 RX ADMIN — ASPIRIN 81 MG: 81 TABLET, COATED ORAL at 08:41

## 2024-04-08 RX ADMIN — BUDESONIDE INHALATION 0.5 MG: 0.5 SUSPENSION RESPIRATORY (INHALATION) at 07:38

## 2024-04-08 RX ADMIN — HYDRALAZINE HYDROCHLORIDE 25 MG: 25 TABLET ORAL at 12:58

## 2024-04-08 RX ADMIN — HYDRALAZINE HYDROCHLORIDE 25 MG: 25 TABLET ORAL at 16:55

## 2024-04-08 RX ADMIN — FORMOTEROL FUMARATE DIHYDRATE 20 MCG: 20 SOLUTION RESPIRATORY (INHALATION) at 07:38

## 2024-04-08 ASSESSMENT — ENCOUNTER SYMPTOMS
VOMITING: 0
SHORTNESS OF BREATH: 0
SPEECH DIFFICULTY: 1
PSYCHIATRIC NEGATIVE: 1
EYES NEGATIVE: 1
FACIAL ASYMMETRY: 1
ENDOCRINE NEGATIVE: 1
ABDOMINAL PAIN: 0
CARDIOVASCULAR NEGATIVE: 1
MUSCULOSKELETAL NEGATIVE: 1
FEVER: 0
NAUSEA: 0
GASTROINTESTINAL NEGATIVE: 1
CHILLS: 0
ALLERGIC/IMMUNOLOGIC NEGATIVE: 1
RESPIRATORY NEGATIVE: 1
HEMATOLOGIC/LYMPHATIC NEGATIVE: 1

## 2024-04-08 ASSESSMENT — COGNITIVE AND FUNCTIONAL STATUS - GENERAL
CLIMB 3 TO 5 STEPS WITH RAILING: A LITTLE
STANDING UP FROM CHAIR USING ARMS: A LITTLE
MOBILITY SCORE: 21
DRESSING REGULAR LOWER BODY CLOTHING: A LITTLE
WALKING IN HOSPITAL ROOM: A LITTLE
DAILY ACTIVITIY SCORE: 23

## 2024-04-08 ASSESSMENT — PAIN - FUNCTIONAL ASSESSMENT
PAIN_FUNCTIONAL_ASSESSMENT: 0-10
PAIN_FUNCTIONAL_ASSESSMENT: 0-10

## 2024-04-08 ASSESSMENT — PAIN SCALES - GENERAL
PAINLEVEL_OUTOF10: 0 - NO PAIN
PAINLEVEL_OUTOF10: 0 - NO PAIN

## 2024-04-08 NOTE — DISCHARGE SUMMARY
Discharge Diagnosis  TIA (transient ischemic attack)    Discharge Meds     Your medication list        START taking these medications        Instructions Last Dose Given Next Dose Due   aspirin 81 mg EC tablet      Take 1 tablet (81 mg) by mouth once daily.              CHANGE how you take these medications        Instructions Last Dose Given Next Dose Due   atorvastatin 10 mg tablet  Commonly known as: Lipitor  What changed: how much to take      Take 4 tablets (40 mg) by mouth once daily at bedtime.              CONTINUE taking these medications        Instructions Last Dose Given Next Dose Due   albuterol 90 mcg/actuation inhaler           alendronate 70 mg tablet  Commonly known as: Fosamax           budesonide-formoteroL 160-4.5 mcg/actuation inhaler  Commonly known as: Symbicort           Caltrate 600 plus D 600 mg-20 mcg (800 unit) tablet,chewable  Generic drug: calcium carbonate-vitamin D3           hydrALAZINE 100 mg tablet  Commonly known as: Apresoline           losartan-hydrochlorothiazide 100-25 mg tablet  Commonly known as: Hyzaar           metoprolol succinate XL 25 mg 24 hr tablet  Commonly known as: Toprol-XL           traZODone 50 mg tablet  Commonly known as: Desyrel                     Where to Get Your Medications        These medications were sent to [facility]/pharmacy #[id number] - [place], [place] - [address]  [id number], [facility] [address]      Phone: [phone]   aspirin 81 mg EC tablet  atorvastatin 10 mg tablet         Test Results Pending At Discharge  Pending Labs       No current pending labs.            Hospital Course  Rosalio Brewster is a 88 y.o. female with PMH significant for renovascular hypertension, R renal artery stenosis, HTN, and who presented to the ER with facial droop, left sided weakness, and dysarthria.   LKW was around 12pm.  Patient was feeling tired so she went to lie down.  Family checked on her around 1250pm and noted patient had facial droop, left sided weakness  and dysarthria.  EMS was called at 3pm, and patient arrived in the ER around 330pm and brain attack was called.  Per report on arrival to the ER left sided weakness had resolved, and only had dysarthria.  CTH negative.  CTA without acute cutoffs.  By the time she returned from CT, she was back to baseline neuro status.  She was given full dose aspirin and admitted to the observation unit for TIA/CVA work-up. Her BP was elevated in the ER, and she received IV hydralazine.  Patient has known renal artery stenosis, with 90% stenosis of R renal artery, and is scheduled to have stent placed on 4/9/24 at [facility].    Per son patient also had negative stress test recently at [facility].       She can read/speak/understand English, but English is 2nd language.  Son does act as  for some questions and to assist in her understanding the commands asked etc.  She lives at home with family.  She is ambulatory baseline, eats healthy, and stays active.     Ct head and ctangio h/n with no acute infarct, stenosis. Mr brain pending. Echo with bubble study ordered as no recent echo and elevated bnp at 771. Plan to dc with referral to neurology and asa daily 81. 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 Fu with pcp on echo result given elevated bnp.    Pertinent Physical Exam At Time of Discharge  Physical Exam  GENERAL: Alert, NAD, cooperative  SKIN: Warm and dry.  No suspicious lesions or rashes.  HEENT:  NCAT, PERRLA, EOMI, nonicteric sclera, MMM, neck supple, trachea midline  LUNGS: Unlabored, CTAB, no significant wheezing, rhonchi, or rales appreciated  CARDS: RRR, no m/g/r appreciated  GI: Soft, NTND, BS+, no rebound, no guarding   : no miller, voids independently   MS/Extremities: WWP, no edema, distal pulses intact, moves all extremities  NEURO: A&Ox4, CN2-12 intact, smile symmetric, strength sensation equal/intact b/l UE and LE, finger-to-nose intact, no dysarthria appreciated (mama, tip-top,  fifty-fifty, thanks, & ), per family dysarthria resolved and now at baseline, patient appears at her baseline mental status currently  PSYCH: mood and behavior appropriate       Outpatient Follow-Up  No future appointments.    Fu with pcp and ok for renal stent tomorrow as planned per [facility] surgeon/provider discretion     Katerina Abreu, APRN-CNP

## 2024-04-08 NOTE — SIGNIFICANT EVENT
Called to speak to son per pt req. Pt had stress test in October. No recent echo that he is aware of. Would like call back when mri is done with results.

## 2024-04-08 NOTE — SIGNIFICANT EVENT
04/07/24 2142   Pediatric Respiratory Consult Service   Pulmonary Status 0   Surgical Status 0   Chest X-Ray 0   Mental Status 0   Breath Sounds 1   Cough 0   Oxygen Requirements 0   Respiratory Acuity Score 1   Triage Level 0 to 2, no treatment indicated

## 2024-04-08 NOTE — CARE PLAN
Problem: Pain - Adult  Goal: Verbalizes/displays adequate comfort level or baseline comfort level  Outcome: Progressing     Problem: Safety - Adult  Goal: Free from fall injury  Outcome: Progressing       
  Problem: Pain - Adult  Goal: Verbalizes/displays adequate comfort level or baseline comfort level  Outcome: Progressing     Problem: Safety - Adult  Goal: Free from fall injury  Outcome: Progressing     Problem: Discharge Planning  Goal: Discharge to home or other facility with appropriate resources  Outcome: Progressing     Problem: Chronic Conditions and Co-morbidities  Goal: Patient's chronic conditions and co-morbidity symptoms are monitored and maintained or improved  Outcome: Progressing   The patient's goals for the shift include      The clinical goals for the shift include patient will remain hemodynamically stable this shift      
Xray Chest 2 Views PA/Lat

## 2024-04-08 NOTE — PROGRESS NOTES
04/08/24 1130   Discharge Planning   Patient expects to be discharged to: Home     Met with patient and ALLIE during IDR--patient to go home after MRI (Scheduled for 1400).  ALLIE or family will transport patient home.  No home going needs.  Patient lives with her son.  Will continue to follow for discharge planning needs.

## 2024-04-08 NOTE — HOSPITAL COURSE
dysarthria and left-sided weakness. At approximately noon the patient told her family that she felt tired and was going to lie down. At approximately 12:50 PM family checked on her and noted that she had a facial droop and left-sided weakness and dysarthria. They did not call EMS until 3 PM. On presentation to the ED her left-sided weakness has resolved but she is left with dysarthria/aphasia. She is denying chest pain, headache, change in vision.

## 2024-04-08 NOTE — DISCHARGE INSTRUCTIONS
[facility] Observation Unit Discharge Instructions  You came to the hospital with stroke-like symptoms and were admitted for observation and further care.   Your symptoms resolved and testing did not show a stroke. Medications were started to help reduce your risks for a stoke. Your MRI does not show a new stroke, so you may have had a TIA. You were given a referral to see cardiology.    Your scans did show some lymph enlargement in chest and follow up ct scan is recommended.      Your discharge plan is to go home to recover.    Please see your primary care doctor in 1 week for follow-up.   An appointment has been requested for you but may you need to call your doctors office to schedule.      See the attached information for education about any new medications and the condition(s) you were treated for.  Your medications may have changed so pay close attention to the list given to you at discharge and ask any questions you have before leaving the hospital.